# Patient Record
(demographics unavailable — no encounter records)

---

## 2017-06-27 NOTE — NUR
PATIENT CLEARED FOR DISCHARGE.  VITALS STABLE. PATIENT INFORMED TO F/U WITH 
PRIMARY MD. PATIENT VERBALIZES UNDERSTANDING. PATIENT DISCHARGED HOME 
AMBULATORY.

## 2017-06-27 NOTE — NUR
cardizem 20 mg ivp given, heart rate prior to iv 147, remaining vitals stable, 
will continue to monitor.

## 2017-06-27 NOTE — NUR
PATIENT'S BLOOD PRESSURE REMAINS TSABLE, 103/71. HEART RATE NOW 87. PATIENT 
FEELING A LOT BETTER. WILL CONTINUE TO MONITOR .